# Patient Record
Sex: FEMALE | Race: WHITE | HISPANIC OR LATINO | Employment: UNEMPLOYED | URBAN - METROPOLITAN AREA
[De-identification: names, ages, dates, MRNs, and addresses within clinical notes are randomized per-mention and may not be internally consistent; named-entity substitution may affect disease eponyms.]

---

## 2017-05-24 ENCOUNTER — TELEPHONE (OUTPATIENT)
Dept: FAMILY MEDICINE | Age: 67
End: 2017-05-24

## 2019-07-30 ENCOUNTER — HOSPITAL ENCOUNTER (OUTPATIENT)
Dept: HOSPITAL 10 - GIL | Age: 69
Discharge: HOME | End: 2019-07-30
Attending: INTERNAL MEDICINE
Payer: MEDICARE

## 2019-07-30 ENCOUNTER — HOSPITAL ENCOUNTER (OUTPATIENT)
Dept: HOSPITAL 91 - GIL | Age: 69
Discharge: HOME | End: 2019-07-30
Payer: MEDICARE

## 2019-07-30 VITALS — HEART RATE: 70 BPM | DIASTOLIC BLOOD PRESSURE: 64 MMHG | SYSTOLIC BLOOD PRESSURE: 138 MMHG | RESPIRATION RATE: 16 BRPM

## 2019-07-30 VITALS
BODY MASS INDEX: 31.3 KG/M2 | HEIGHT: 61 IN | WEIGHT: 165.79 LBS | HEIGHT: 61 IN | WEIGHT: 165.79 LBS | BODY MASS INDEX: 31.3 KG/M2

## 2019-07-30 VITALS — DIASTOLIC BLOOD PRESSURE: 63 MMHG | HEART RATE: 69 BPM | SYSTOLIC BLOOD PRESSURE: 131 MMHG | RESPIRATION RATE: 12 BRPM

## 2019-07-30 DIAGNOSIS — K21.9: ICD-10-CM

## 2019-07-30 DIAGNOSIS — K44.9: Primary | ICD-10-CM

## 2019-07-30 DIAGNOSIS — M06.9: ICD-10-CM

## 2019-07-30 DIAGNOSIS — K29.60: ICD-10-CM

## 2019-07-30 DIAGNOSIS — E78.5: ICD-10-CM

## 2019-07-30 PROCEDURE — 43239 EGD BIOPSY SINGLE/MULTIPLE: CPT

## 2019-07-30 PROCEDURE — 88305 TISSUE EXAM BY PATHOLOGIST: CPT

## 2019-07-30 NOTE — PAC
Date/Time of Note


Date/Time of Note


DATE: 7/30/19 


TIME: 08:14





Post-Anesthesia Notes


Post-Anesthesia Note


Last documented vital signs





Vital Signs


  Date      Temp  Pulse  Resp  B/P (MAP)   Pulse Ox  O2          O2 Flow    FiO2


Time                                                 Delivery    Rate


   7/30/19  97.0     69    12      131/63        98  Room Air


     06:57                           (85)





Activity:  WNL


Respiratory function:  WNL


Cardiovascular function:  WNL


Mental status:  Baseline


Pain reasonably controlled:  Yes


Hydration appropriate:  Yes


Nausea/Vomiting absent:  Yes











Alexis Ellis M.D.      Jul 30, 2019 08:14

## 2019-07-30 NOTE — PREAC
Date/Time of Note


Date/Time of Note


DATE: 7/30/19 


TIME: 07:53





Anesthesia Eval and Record


Evaluation


Time Pre-Procedure Interview


DATE: 7/30/19 


TIME: 07:53


Age


68


Sex


female


NPO:  8 hrs


Preoperative diagnosis


ESOPHAGEAL REFLUX


Planned procedure


EGD





Past Medical History


Past Medical History:  Includes


Cardio:  Dyslipidemia


Musculoskeletal:  Rheumatoid arthritis


GI:  Obesity





Surgery & Anesthesia Issues


No known issue





Meds


Anticoagulation:  No


Beta Blocker within 24 hr:  No


Reason Beta Blocker not given:  Pt. not on B-Blocker


Reported Medications


Omeprazole* (Omeprazole*) 40 Mg Capsule.dr, 40 MG PO DAILY, #30 CAP


   7/30/19


Atorvastatin Calcium* (Atorvastatin Calcium*) 20 Mg Tablet, 20 MG PO QHS, #30 


TAB


   7/30/19


Rituximab (Rituxan) 10 Mg/Ml Soln, 500 MG IV TWICE PER WEEK


   7/30/19


Naproxen* (Naproxen*) 500 Mg Tablet, 500 MG PO BID, TAB


   7/30/19


Discontinued Reported Medications


Naproxen* (Naproxen*) 250 Mg Tablet, 250 MG PO PRN for PAIN


   5/8/14


Tramadol Hcl* (Ultram* ER) 200 Mg Tab.sr.24h, 200 MG PO PRN for PAIN


   5/8/14


Gabapentin (GABAPENTIN) 300 Mg/6 Ml Solution, 300 MG PO DAILY


   5/8/14


Meds reviewed:  Yes





Allergies


Coded Allergies:  


     methotrexate (Verified  Allergy, Severe, THROAT SWELLING, 7/30/19)


     aspirin (Verified  Adverse Reaction, Intermediate, NAUSEA, 7/30/19)


Allergies Reviewed:  Yes





Labs/Studies


Labs Reviewed:  Reviewed by anesthesiologist


Pregnancy test:  N/A





Pre-procedure Exam


Last vitals





Vital Signs


  Date      Temp  Pulse  Resp  B/P (MAP)   Pulse Ox  O2          O2 Flow    FiO2


Time                                                 Delivery    Rate


   7/30/19  97.0     69    12      131/63        98  Room Air


     06:57                           (85)





Airway:  Adequate mouth opening, Adequate thyromental dist


Mallampati:  Mallampati II


Teeth:  Normal


Lung:  Normal


Heart:  Normal





ASA Physical Status


ASA physical status:  2


Emergency:  None





Planned Anesthetic


General/MAC:  MAC





Planned Pain Management


Parenteral pain med





Pre-operative Attestations


Prior to commencing anesthesia and surgery, the patient was re-evaluated, there 


was verification of:


*The patient's identity


*The results of appropriate recent lab work and preoperative vital signs


*The above evaluation not changing prior to induction


*Anesthetic plan, risk benefits, alternative and complications discussed with 


patient/family; questions answered; patient/family understands, accepts and 


wishes to proceed.











Alexis Ellis M.D.      Jul 30, 2019 07:54

## 2019-07-30 NOTE — CONS
DATE OF ADMISSION: 07/30/2019

DATE OF CONSULTATION:  

 

 

 

PATIENT NAME: DESIRE EPSTEIN

 

TYPE OF CONSULTATION:  Preoperative gastroenterology.

 

HISTORY OF PRESENT ILLNESS:  Ms. Desire Epstein is a 68-year-old female patient who has been referred 
to me for further evaluation of chronic heartburn not responding to therapy with omeprazole.  No past
 history of peptic ulcer disease.  The patient has been taking naproxen for rheumatoid arthritis.  No
 history of gallstones or liver disease.  The patient had colonoscopy 4 years ago, and she was noted 
to have angiodysplastic lesions in the colon.  No colon neoplasm was identified.  Not a hypertensive 
or diabetic.  No heart disease, lung problem or kidney disease.  The patient has hyperlipidemia.  She
 has rheumatoid arthritis.

 

SOCIAL HISTORY:  She is a smoker.  Denies alcohol abuse.

 

FAMILY HISTORY:  No family history of gastrointestinal tract neoplasm.

 

ALLERGIES:  NO DRUG ALLERGIES.

 

MEDICATIONS:

1.  Omeprazole 40 mg p.o. q.a.m.

2.  Atorvastatin 20 mg p.o. q.a.m.

3.  Rituxan infusion every 2 weeks for rheumatoid arthritis

4.  Naproxen 500 mg p.o. b.i.d.  

 

PHYSICAL EXAMINATION:  

VITAL SIGNS:  She is 5 feet 3 inches tall and weighs 155 pounds.  BMI 28.  Blood pressure 124/72.

HEART:  Normal heart sounds.

LUNGS:  Clear.

ABDOMEN:  Soft, no masses.  Normal bowel sounds.

NEUROLOGIC:  Normal neurological exam.

 

IMPRESSION:

1.  Chronic heartburn, not responding to therapy with omeprazole.

2.  The patient had colonoscopy 4 years ago and she was noted to have angiodysplastic lesions in the 
rectum and no colon neoplasm was identified.

3.  Hyperlipidemia.

4.  Rheumatoid arthritis.

5.  The patient has been taking naproxen.

6.  The patient is a smoker.

7.  Elevated body mass index.

 

PLAN:

1.  The patient was advised weight loss.

2.  Follow up with the primary MD for the management of elevated BMI.

3.  Endoscopic examination for further evaluation.

 

The procedure and possible complications were well explained to the patient.  She understands and con
sents to the procedure.

 

I thank you once again.

 

With warmest personal regards,

 

 

Dictated By: BRISSA FAJARDO/FIDEL

DD:    07/17/2019 15:34:39

DT:    07/17/2019 21:53:20

Conf#: [id number]

DID#:  [id number]